# Patient Record
Sex: FEMALE | Race: WHITE | Employment: UNEMPLOYED | URBAN - METROPOLITAN AREA
[De-identification: names, ages, dates, MRNs, and addresses within clinical notes are randomized per-mention and may not be internally consistent; named-entity substitution may affect disease eponyms.]

---

## 2022-10-03 ENCOUNTER — HOSPITAL ENCOUNTER (EMERGENCY)
Age: 32
Discharge: HOME | End: 2022-10-03
Payer: COMMERCIAL

## 2022-10-03 VITALS
RESPIRATION RATE: 18 BRPM | WEIGHT: 170 LBS | TEMPERATURE: 98.4 F | OXYGEN SATURATION: 99 % | BODY MASS INDEX: 31.09 KG/M2 | DIASTOLIC BLOOD PRESSURE: 84 MMHG | HEART RATE: 80 BPM | SYSTOLIC BLOOD PRESSURE: 137 MMHG

## 2022-10-03 DIAGNOSIS — M54.32 SCIATICA OF LEFT SIDE: Primary | ICD-10-CM

## 2022-10-03 PROCEDURE — 99284 EMERGENCY DEPT VISIT MOD MDM: CPT

## 2022-10-03 PROCEDURE — 96372 THER/PROPH/DIAG INJ SC/IM: CPT

## 2022-10-03 PROCEDURE — 6360000002 HC RX W HCPCS: Performed by: PHYSICIAN ASSISTANT

## 2022-10-03 PROCEDURE — 6370000000 HC RX 637 (ALT 250 FOR IP): Performed by: PHYSICIAN ASSISTANT

## 2022-10-03 RX ORDER — PREDNISONE 20 MG/1
TABLET ORAL
Qty: 24 TABLET | Refills: 0 | Status: SHIPPED | OUTPATIENT
Start: 2022-10-03

## 2022-10-03 RX ORDER — MELOXICAM 7.5 MG/1
7.5 TABLET ORAL 2 TIMES DAILY
Qty: 30 TABLET | Refills: 0 | Status: SHIPPED | OUTPATIENT
Start: 2022-10-03

## 2022-10-03 RX ORDER — METHOCARBAMOL 500 MG/1
500 TABLET, FILM COATED ORAL 3 TIMES DAILY
Qty: 21 TABLET | Refills: 0 | Status: SHIPPED | OUTPATIENT
Start: 2022-10-03 | End: 2022-10-10

## 2022-10-03 RX ORDER — KETOROLAC TROMETHAMINE 30 MG/ML
30 INJECTION, SOLUTION INTRAMUSCULAR; INTRAVENOUS ONCE
Status: COMPLETED | OUTPATIENT
Start: 2022-10-03 | End: 2022-10-03

## 2022-10-03 RX ORDER — METHOCARBAMOL 500 MG/1
500 TABLET, FILM COATED ORAL ONCE
Status: COMPLETED | OUTPATIENT
Start: 2022-10-03 | End: 2022-10-03

## 2022-10-03 RX ORDER — HYDROCODONE BITARTRATE AND ACETAMINOPHEN 5; 325 MG/1; MG/1
1 TABLET ORAL ONCE
Status: COMPLETED | OUTPATIENT
Start: 2022-10-03 | End: 2022-10-03

## 2022-10-03 RX ADMIN — KETOROLAC TROMETHAMINE 30 MG: 30 INJECTION, SOLUTION INTRAMUSCULAR; INTRAVENOUS at 21:51

## 2022-10-03 RX ADMIN — METHOCARBAMOL 500 MG: 500 TABLET ORAL at 21:50

## 2022-10-03 RX ADMIN — HYDROCODONE BITARTRATE AND ACETAMINOPHEN 1 TABLET: 5; 325 TABLET ORAL at 21:51

## 2022-10-03 ASSESSMENT — PAIN DESCRIPTION - FREQUENCY: FREQUENCY: CONTINUOUS

## 2022-10-03 ASSESSMENT — PAIN DESCRIPTION - LOCATION
LOCATION: BACK
LOCATION: BACK

## 2022-10-03 ASSESSMENT — PAIN DESCRIPTION - ONSET: ONSET: SUDDEN

## 2022-10-03 ASSESSMENT — PAIN DESCRIPTION - DESCRIPTORS
DESCRIPTORS: THROBBING
DESCRIPTORS: THROBBING

## 2022-10-03 ASSESSMENT — PAIN - FUNCTIONAL ASSESSMENT
PAIN_FUNCTIONAL_ASSESSMENT: 0-10
PAIN_FUNCTIONAL_ASSESSMENT: PREVENTS OR INTERFERES WITH MANY ACTIVE NOT PASSIVE ACTIVITIES

## 2022-10-03 ASSESSMENT — PAIN DESCRIPTION - ORIENTATION
ORIENTATION: LEFT;LOWER
ORIENTATION: LEFT

## 2022-10-03 ASSESSMENT — PAIN DESCRIPTION - PAIN TYPE: TYPE: ACUTE PAIN

## 2022-10-03 ASSESSMENT — PAIN SCALES - GENERAL
PAINLEVEL_OUTOF10: 8
PAINLEVEL_OUTOF10: 7

## 2022-10-04 NOTE — ED PROVIDER NOTES
EMERGENCY DEPARTMENT ENCOUNTER      PCP: Nanda Sandhu MD    CHIEF COMPLAINT    No chief complaint on file. This patient was not evaluated by the attending physician. I have independently evaluated this patient . HPI    Susen Oppenheim is a 34 y.o. female who presents to the emergency department today with a chief complaint of lower back left leg pain. Has history of sciatica, was recently seen at [facility] emergency department for similar, was treated with steroids and muscle relaxers, states her symptoms did improve but now she is having rebound symptoms. She locates pain into the left buttocks and intermittent paresthesias to the left lower leg. No weakness. No recent trauma or injury. No alarming symptoms, no bowel or bladder incontinence, saddle anesthesia radicular weakness. No history of IV drug use. No history of back surgery. Denies significant alcohol abuse. No significant night pain. She is not immunocompromise. REVIEW OF SYSTEMS    General:   Denies fever, weight loss or weakness. Denies recent/current systemic infection, recent spinal fracture or procedure, or immunosuppression. Denies history of IVDA. ENT:  No upper respiratory symptoms  Neck:  See HPI  Cardiovascular:  Denies chest pain, palpitations or swelling  Respiratory:  Denies cough or shortness of breath    GI:  Denies abdominal pain, nausea, vomiting. Denies bowel incontinence  :  Denies any urinary symptoms (including incontinence or retention) or  vaginal symptoms. Denies pregnancy. No recent or current indwelling urinary catheter  Musculoskeletal:  No upper or lower extremity pain or functional deficits. No numbness or tingling. Skin:  Denies rash  Neurologic:   No bowel incontinence or bladder retention, No saddle anesthesia. No radicular symptoms. No lower extremity weakness or altered sensation.   Endocrine:  Denies polyuria or polydypsia   Lymphatic:  Denies swollen glands     All other review of systems are negative  See HPI and nursing notes for additional information       PAST MEDICAL & SURGICAL HISTORY    Past Medical History:   Diagnosis Date    Anxiety     Asthma     Depression     Seizures ([address]      No past surgical history on file. CURRENT MEDICATIONS    Current Outpatient Rx   Medication Sig Dispense Refill    predniSONE (DELTASONE) 20 MG tablet Take 3 tabs PO qd for 4 days, then 2 tabs PO qd for 4 days, then 1 tab PO qd for 4 days, Disp-24 tablet, R-0 24 tablet 0    methocarbamol (ROBAXIN) 500 MG tablet Take 1 tablet by mouth 3 times daily for 7 days 21 tablet 0    meloxicam (MOBIC) 7.5 MG tablet Take 1 tablet by mouth in the morning and at bedtime 30 tablet 0    lidocaine viscous (XYLOCAINE) 2 % solution Put viscous lidocaine in specimen cup and soak cotton balls. Patient to apply cotton ball to affected region every 3-4  hours as needed when necessary pain 120 mL 0    ibuprofen (ADVIL;MOTRIN) 800 MG tablet Take 1 tablet by mouth every 6 hours as needed for Pain 30 tablet 1    butalbital-acetaminophen-caffeine (FIORICET) -40 MG per tablet Take 1 tablet by mouth every 6 hours as needed for Headaches 20 tablet 0       ALLERGIES    Allergies   Allergen Reactions    Vistaril [Hydroxyzine Hcl]      \"makes my skin burn\"       SOCIAL HISTORY    Social History     Socioeconomic History    Marital status: Single   Tobacco Use    Smoking status: Every Day     Packs/day: 1.00     Types: Cigarettes   Substance and Sexual Activity    Alcohol use: No     Alcohol/week: 0.0 standard drinks     Comment: seldom    Drug use: No       PHYSICAL EXAM    VITAL SIGNS: There were no vitals taken for this visit. Patient was noted to be afebrile    Constitutional:  Well developed, well nourished. HENT:  Atraumatic,  Moist mucus membranes. Eyes:  No orbital trauma. No scleral icterus. Neck: No JVD, supple. No enlarged lymph nodes.    Cardiovascular:  Normal rate, regular rhythm, no murmurs/rubs/gallops  Respiratory:  No respiratory distress, normal breath sounds. Abdomen: Bowel sounds normal, Soft, No tenderness, no masses. Musculoskeletal:     No lower extremity swelling, discoloration, focal tenderness, palpable cord. Negative Mara's sign negative  Integument:  Well hydrated, no rash, no pallor. Back:   - No gross deformity, swelling, or discoloration.    -  + generalized lumbar and Paralumbar tenderness without focus. No masses, fluctuance, warmth, or skin changes.  - No localized midline bony tenderness.   - No change in pain with forward flexion  - SLR test negative     No CVA tenderness to percussion      Neurologic:    - Alert & oriented person, place, time, and situation, no speech difficulties or slurring.  - No obvious gross motor deficits  - Cranial nerves 2-12 grossly intact  - Negative meningeal signs.  - Sensation intact to light touch  - Strength 5/5 in upper and lower extremities bilaterally    HIGH sensitivity Neuro Exam for Lumbar spine  -(L1-L2)  inner thigh sensation intact  -(S3,4,5) Groin sensation intact     -Lower extremity ROM intact including:       -(L2) cross legs (adduct thighs)        -(L3) extend knees & flex knees (S1)       -(L4) dorsiflex ankles       -(L5) point great toes upward & plantar flex toes (S2)        -(L2,3,4) Knee reflexes intact bilaterally      Vascular:    Femoral & Distal pulses, & capillary refill intact bilateral lower extremities    ED COURSE & MEDICAL DECISION MAKING       Based on Pt's history (including stratification of the above red flags) & physical exam findings today,  I do not see evidence of spinal cord compression/focal neuro deficits, cauda equina syndrome, epidural abscess, or osteomyelitis on exam today. History and exam is consistent with musculoskeletal back pain/left-sided sciatica without alarming symptoms- Symptomatic treatment provided today.     I discussed stretching exercises and avoid vigorous activity today at bedside. I recommend supportive OTC back brace for the next several days. I recommend followup with primary care provider/orthopedic spine/neurosurgery over the next several days for recheck. Patient agrees to return emergency department if symptoms worsen or any new symptoms develop - this was discussed in detail at beside with Pt. Clinical  IMPRESSION    1. Sciatica of left side                Comment: Please note this report has been produced using speech recognition software and may contain errors related to that system including errors in grammar, punctuation, and spelling, as well as words and phrases that may be inappropriate. If there are any questions or concerns please feel free to contact the dictating provider for clarification.       Marjorie Elizalde [address], PA  10/03/22 3115

## 2023-03-26 ENCOUNTER — HOSPITAL ENCOUNTER (EMERGENCY)
Age: 33
Discharge: HOME | End: 2023-03-26
Payer: MEDICAID

## 2023-03-26 VITALS
HEART RATE: 108 BPM | DIASTOLIC BLOOD PRESSURE: 91 MMHG | OXYGEN SATURATION: 97 % | BODY MASS INDEX: 36.8 KG/M2 | TEMPERATURE: 98.2 F | WEIGHT: 200 LBS | RESPIRATION RATE: 18 BRPM | SYSTOLIC BLOOD PRESSURE: 138 MMHG | HEIGHT: 62 IN

## 2023-03-26 DIAGNOSIS — M54.40 LUMBAGO OF LUMBAR REGION WITH SCIATICA: Primary | ICD-10-CM

## 2023-03-26 PROCEDURE — 6370000000 HC RX 637 (ALT 250 FOR IP): Performed by: NURSE PRACTITIONER

## 2023-03-26 PROCEDURE — 99283 EMERGENCY DEPT VISIT LOW MDM: CPT

## 2023-03-26 PROCEDURE — 6360000002 HC RX W HCPCS: Performed by: NURSE PRACTITIONER

## 2023-03-26 RX ORDER — CYCLOBENZAPRINE HCL 10 MG
10 TABLET ORAL 3 TIMES DAILY PRN
Qty: 21 TABLET | Refills: 0 | Status: SHIPPED | OUTPATIENT
Start: 2023-03-26 | End: 2023-04-05

## 2023-03-26 RX ORDER — CYCLOBENZAPRINE HCL 10 MG
10 TABLET ORAL ONCE
Status: COMPLETED | OUTPATIENT
Start: 2023-03-26 | End: 2023-03-26

## 2023-03-26 RX ORDER — LIDOCAINE 50 MG/G
1 PATCH TOPICAL DAILY
Qty: 5 PATCH | Refills: 0 | Status: SHIPPED | OUTPATIENT
Start: 2023-03-26 | End: 2023-03-31

## 2023-03-26 RX ORDER — PREDNISONE 20 MG/1
20 TABLET ORAL 2 TIMES DAILY
Qty: 10 TABLET | Refills: 0 | Status: SHIPPED | OUTPATIENT
Start: 2023-03-26 | End: 2023-03-31

## 2023-03-26 RX ORDER — NAPROXEN 500 MG/1
500 TABLET ORAL 2 TIMES DAILY
Qty: 28 TABLET | Refills: 0 | Status: SHIPPED | OUTPATIENT
Start: 2023-03-26 | End: 2023-04-09

## 2023-03-26 RX ORDER — DEXAMETHASONE SODIUM PHOSPHATE 10 MG/ML
10 INJECTION, SOLUTION INTRAMUSCULAR; INTRAVENOUS ONCE
Status: COMPLETED | OUTPATIENT
Start: 2023-03-26 | End: 2023-03-26

## 2023-03-26 RX ORDER — OXYCODONE AND ACETAMINOPHEN 7.5; 325 MG/1; MG/1
1 TABLET ORAL ONCE
Status: COMPLETED | OUTPATIENT
Start: 2023-03-26 | End: 2023-03-26

## 2023-03-26 RX ADMIN — CYCLOBENZAPRINE 10 MG: 10 TABLET, FILM COATED ORAL at 12:16

## 2023-03-26 RX ADMIN — DEXAMETHASONE SODIUM PHOSPHATE 10 MG: 10 INJECTION INTRAMUSCULAR; INTRAVENOUS at 12:17

## 2023-03-26 RX ADMIN — OXYCODONE AND ACETAMINOPHEN 1 TABLET: 7.5; 325 TABLET ORAL at 12:17

## 2023-03-26 ASSESSMENT — PAIN DESCRIPTION - LOCATION
LOCATION: BACK;LEG
LOCATION: BACK;LEG

## 2023-03-26 ASSESSMENT — PAIN DESCRIPTION - DESCRIPTORS: DESCRIPTORS: TINGLING;PINS AND NEEDLES

## 2023-03-26 ASSESSMENT — PAIN SCALES - GENERAL: PAINLEVEL_OUTOF10: 5

## 2023-03-26 ASSESSMENT — PAIN DESCRIPTION - ORIENTATION
ORIENTATION: LEFT
ORIENTATION: LEFT

## 2023-03-26 ASSESSMENT — PAIN DESCRIPTION - FREQUENCY: FREQUENCY: CONTINUOUS

## 2023-03-26 NOTE — DISCHARGE INSTRUCTIONS
Return to the emergency department for reevaluation if you develop any fever, abdominal pain, persistent vomiting, sudden weakness of your legs, numbness of your legs, numbness in your genital or rectal area, or sudden loss of control or retention of your urine or bowels. These symptoms could indicate a medical emergency and need evaluated right away.

## 2023-03-26 NOTE — ED PROVIDER NOTES
Vistaril [hydroxyzine hcl]    FAMILYHISTORY     History reviewed. No pertinent family history. SOCIAL HISTORY       Social History     Socioeconomic History    Marital status: Single     Spouse name: None    Number of children: None    Years of education: None    Highest education level: None   Tobacco Use    Smoking status: Every Day     Packs/day: 1.00     Types: Cigarettes    Smokeless tobacco: Never   Substance and Sexual Activity    Alcohol use: No     Alcohol/week: 0.0 standard drinks     Comment: seldom    Drug use: No       SCREENINGS           PHYSICAL EXAM       ED Triage Vitals [03/26/23 1035]   BP Temp Temp Source Heart Rate Resp SpO2 Height Weight   (!) 138/91 98.2 °F (36.8 °C) Oral (!) 108 18 97 % 5' 2\" (1.575 m) 200 lb (90.7 kg)        Constitutional:  Well developed, Well nourished. Moderate painful distress  Cardiovascular:   RRR,  no murmurs/rubs/gallops. Respiratory:   Nonlabored breathing. Normal breath sounds, No wheezing  Abdomen: Bowel sounds normal, Soft, No tenderness, no masses. Integument:   Warm, Dry, No rashes. Psychiatric:  Affect normal, Mood normal.       Musculoskeletal:  Bilateral upper and lower extremity ROM intact without pain or obvious deficit. No lower extremity swelling, discoloration, focal tenderness, palpable cord. Back:   - No gross deformity, swelling, or discoloration.    - + generalized lumbar and Paralumbar tenderness without focus.   No masses, fluctuance, warmth, or skin changes.  - No localized midline bony tenderness.   - No change in pain with forward flexion  - SLR test positive on left    Neurologic:    - Alert & oriented person, place, time, and situation, no speech difficulties or slurring.  - No obvious gross motor deficits  - Sensation intact to light touch   - Strength 5/5 in upper and lower extremities bilaterally    HIGH sensitivity Neuro Exam for Lumbar spine   (L1-L2)  inner thigh sensation intact   (S3,4,5) Groin sensation intact